# Patient Record
Sex: FEMALE | Race: BLACK OR AFRICAN AMERICAN | NOT HISPANIC OR LATINO | Employment: UNEMPLOYED | RURAL
[De-identification: names, ages, dates, MRNs, and addresses within clinical notes are randomized per-mention and may not be internally consistent; named-entity substitution may affect disease eponyms.]

---

## 2022-08-27 ENCOUNTER — HOSPITAL ENCOUNTER (INPATIENT)
Facility: HOSPITAL | Age: 57
LOS: 2 days | Discharge: HOME | DRG: 177 | End: 2022-08-29
Attending: FAMILY MEDICINE | Admitting: FAMILY MEDICINE
Payer: MEDICAID

## 2022-08-27 DIAGNOSIS — Z79.4 TYPE 2 DIABETES MELLITUS WITHOUT COMPLICATION, WITH LONG-TERM CURRENT USE OF INSULIN: ICD-10-CM

## 2022-08-27 DIAGNOSIS — U07.1 PNEUMONIA DUE TO COVID-19 VIRUS: Primary | ICD-10-CM

## 2022-08-27 DIAGNOSIS — I10 PRIMARY HYPERTENSION: ICD-10-CM

## 2022-08-27 DIAGNOSIS — E11.9 TYPE 2 DIABETES MELLITUS WITHOUT COMPLICATION, WITH LONG-TERM CURRENT USE OF INSULIN: ICD-10-CM

## 2022-08-27 DIAGNOSIS — R07.9 CHEST PAIN: ICD-10-CM

## 2022-08-27 DIAGNOSIS — E11.9 TYPE 2 DIABETES MELLITUS WITHOUT COMPLICATION, WITHOUT LONG-TERM CURRENT USE OF INSULIN: ICD-10-CM

## 2022-08-27 DIAGNOSIS — J12.82 PNEUMONIA DUE TO COVID-19 VIRUS: Primary | ICD-10-CM

## 2022-08-27 DIAGNOSIS — J44.1 COPD EXACERBATION: ICD-10-CM

## 2022-08-27 DIAGNOSIS — Z72.0 TOBACCO USE: ICD-10-CM

## 2022-08-27 DIAGNOSIS — U07.1 COVID: ICD-10-CM

## 2022-08-27 DIAGNOSIS — R09.02 HYPOXIA: ICD-10-CM

## 2022-08-27 LAB
APTT PPP: 26.9 SECONDS (ref 25.2–37.3)
GLUCOSE SERPL-MCNC: 246 MG/DL (ref 70–105)
INR BLD: 1
PROTHROMBIN TIME: 12.8 SECONDS (ref 11.7–14.7)

## 2022-08-27 PROCEDURE — 25000003 PHARM REV CODE 250: Performed by: HOSPITALIST

## 2022-08-27 PROCEDURE — 94761 N-INVAS EAR/PLS OXIMETRY MLT: CPT

## 2022-08-27 PROCEDURE — 99222 PR INITIAL HOSPITAL CARE,LEVL II: ICD-10-PCS | Mod: ,,, | Performed by: HOSPITALIST

## 2022-08-27 PROCEDURE — 99222 1ST HOSP IP/OBS MODERATE 55: CPT | Mod: ,,, | Performed by: HOSPITALIST

## 2022-08-27 PROCEDURE — 63600175 PHARM REV CODE 636 W HCPCS: Performed by: FAMILY MEDICINE

## 2022-08-27 PROCEDURE — 85730 THROMBOPLASTIN TIME PARTIAL: CPT | Performed by: FAMILY MEDICINE

## 2022-08-27 PROCEDURE — 82962 GLUCOSE BLOOD TEST: CPT

## 2022-08-27 PROCEDURE — 25000003 PHARM REV CODE 250: Performed by: FAMILY MEDICINE

## 2022-08-27 PROCEDURE — 36415 COLL VENOUS BLD VENIPUNCTURE: CPT | Performed by: FAMILY MEDICINE

## 2022-08-27 PROCEDURE — 11000001 HC ACUTE MED/SURG PRIVATE ROOM

## 2022-08-27 RX ORDER — GLUCAGON 1 MG
1 KIT INJECTION
Status: DISCONTINUED | OUTPATIENT
Start: 2022-08-27 | End: 2022-08-29 | Stop reason: HOSPADM

## 2022-08-27 RX ORDER — DULOXETIN HYDROCHLORIDE 20 MG/1
20 CAPSULE, DELAYED RELEASE ORAL DAILY
Status: DISCONTINUED | OUTPATIENT
Start: 2022-08-28 | End: 2022-08-29 | Stop reason: HOSPADM

## 2022-08-27 RX ORDER — INSULIN ASPART 100 [IU]/ML
0-5 INJECTION, SOLUTION INTRAVENOUS; SUBCUTANEOUS
Status: DISCONTINUED | OUTPATIENT
Start: 2022-08-27 | End: 2022-08-29 | Stop reason: HOSPADM

## 2022-08-27 RX ORDER — ALBUTEROL SULFATE 90 UG/1
2-4 AEROSOL, METERED RESPIRATORY (INHALATION) 4 TIMES DAILY PRN
COMMUNITY
Start: 2022-08-03

## 2022-08-27 RX ORDER — ENOXAPARIN SODIUM 100 MG/ML
1 INJECTION SUBCUTANEOUS
Status: DISCONTINUED | OUTPATIENT
Start: 2022-08-27 | End: 2022-08-27

## 2022-08-27 RX ORDER — HYDROCODONE BITARTRATE AND ACETAMINOPHEN 7.5; 325 MG/1; MG/1
1 TABLET ORAL EVERY 6 HOURS PRN
Status: DISCONTINUED | OUTPATIENT
Start: 2022-08-27 | End: 2022-08-29 | Stop reason: HOSPADM

## 2022-08-27 RX ORDER — ACETAMINOPHEN 500 MG
1000 TABLET ORAL EVERY 6 HOURS PRN
Status: DISCONTINUED | OUTPATIENT
Start: 2022-08-27 | End: 2022-08-29 | Stop reason: HOSPADM

## 2022-08-27 RX ORDER — SODIUM CHLORIDE 0.9 % (FLUSH) 0.9 %
10 SYRINGE (ML) INJECTION EVERY 12 HOURS PRN
Status: DISCONTINUED | OUTPATIENT
Start: 2022-08-27 | End: 2022-08-29 | Stop reason: HOSPADM

## 2022-08-27 RX ORDER — MUPIROCIN 20 MG/G
OINTMENT TOPICAL 2 TIMES DAILY
Status: DISCONTINUED | OUTPATIENT
Start: 2022-08-27 | End: 2022-08-29 | Stop reason: HOSPADM

## 2022-08-27 RX ORDER — LOSARTAN POTASSIUM 100 MG/1
100 TABLET ORAL DAILY
COMMUNITY

## 2022-08-27 RX ORDER — FLUTICASONE FUROATE, UMECLIDINIUM BROMIDE AND VILANTEROL TRIFENATATE 200; 62.5; 25 UG/1; UG/1; UG/1
1 POWDER RESPIRATORY (INHALATION) DAILY
COMMUNITY

## 2022-08-27 RX ORDER — FUROSEMIDE 40 MG/1
40 TABLET ORAL DAILY
Status: DISCONTINUED | OUTPATIENT
Start: 2022-08-28 | End: 2022-08-29 | Stop reason: HOSPADM

## 2022-08-27 RX ORDER — METFORMIN HYDROCHLORIDE 1000 MG/1
1000 TABLET ORAL 2 TIMES DAILY
COMMUNITY

## 2022-08-27 RX ORDER — DEXAMETHASONE SODIUM PHOSPHATE 4 MG/ML
6 INJECTION, SOLUTION INTRA-ARTICULAR; INTRALESIONAL; INTRAMUSCULAR; INTRAVENOUS; SOFT TISSUE EVERY 24 HOURS
Status: DISCONTINUED | OUTPATIENT
Start: 2022-08-28 | End: 2022-08-29

## 2022-08-27 RX ORDER — GABAPENTIN 300 MG/1
300 CAPSULE ORAL 2 TIMES DAILY
Status: DISCONTINUED | OUTPATIENT
Start: 2022-08-27 | End: 2022-08-29 | Stop reason: HOSPADM

## 2022-08-27 RX ORDER — DULOXETIN HYDROCHLORIDE 20 MG/1
20 CAPSULE, DELAYED RELEASE ORAL DAILY
COMMUNITY

## 2022-08-27 RX ORDER — GABAPENTIN 300 MG/1
300 CAPSULE ORAL 2 TIMES DAILY
COMMUNITY
Start: 2022-07-27

## 2022-08-27 RX ORDER — GUAIFENESIN/DEXTROMETHORPHAN 100-10MG/5
10 SYRUP ORAL EVERY 6 HOURS PRN
Status: DISCONTINUED | OUTPATIENT
Start: 2022-08-27 | End: 2022-08-29 | Stop reason: HOSPADM

## 2022-08-27 RX ORDER — HYDROCODONE BITARTRATE AND ACETAMINOPHEN 7.5; 325 MG/1; MG/1
1 TABLET ORAL EVERY 6 HOURS PRN
COMMUNITY
Start: 2022-07-27

## 2022-08-27 RX ORDER — DAPAGLIFLOZIN 10 MG/1
10 TABLET, FILM COATED ORAL EVERY MORNING
COMMUNITY
Start: 2022-07-20

## 2022-08-27 RX ORDER — ALBUTEROL SULFATE 90 UG/1
2 AEROSOL, METERED RESPIRATORY (INHALATION) EVERY 4 HOURS PRN
Status: DISCONTINUED | OUTPATIENT
Start: 2022-08-27 | End: 2022-08-29 | Stop reason: HOSPADM

## 2022-08-27 RX ORDER — ONDANSETRON 2 MG/ML
8 INJECTION INTRAMUSCULAR; INTRAVENOUS EVERY 6 HOURS PRN
Status: DISCONTINUED | OUTPATIENT
Start: 2022-08-27 | End: 2022-08-29 | Stop reason: HOSPADM

## 2022-08-27 RX ORDER — FUROSEMIDE 40 MG/1
40 TABLET ORAL DAILY
COMMUNITY

## 2022-08-27 RX ORDER — SIMETHICONE 80 MG
1 TABLET,CHEWABLE ORAL 3 TIMES DAILY PRN
Status: DISCONTINUED | OUTPATIENT
Start: 2022-08-27 | End: 2022-08-29 | Stop reason: HOSPADM

## 2022-08-27 RX ORDER — LOSARTAN POTASSIUM 100 MG/1
100 TABLET ORAL DAILY
Status: DISCONTINUED | OUTPATIENT
Start: 2022-08-28 | End: 2022-08-29 | Stop reason: HOSPADM

## 2022-08-27 RX ORDER — BISACODYL 5 MG
10 TABLET, DELAYED RELEASE (ENTERIC COATED) ORAL DAILY PRN
Status: DISCONTINUED | OUTPATIENT
Start: 2022-08-27 | End: 2022-08-29 | Stop reason: HOSPADM

## 2022-08-27 RX ORDER — FAMOTIDINE 20 MG/1
20 TABLET, FILM COATED ORAL 2 TIMES DAILY
Status: DISCONTINUED | OUTPATIENT
Start: 2022-08-27 | End: 2022-08-29 | Stop reason: HOSPADM

## 2022-08-27 RX ORDER — TRAZODONE HYDROCHLORIDE 50 MG/1
50 TABLET ORAL NIGHTLY PRN
Status: DISCONTINUED | OUTPATIENT
Start: 2022-08-27 | End: 2022-08-29 | Stop reason: HOSPADM

## 2022-08-27 RX ORDER — ENOXAPARIN SODIUM 100 MG/ML
1 INJECTION SUBCUTANEOUS
Status: DISCONTINUED | OUTPATIENT
Start: 2022-08-28 | End: 2022-08-29 | Stop reason: HOSPADM

## 2022-08-27 RX ADMIN — INSULIN DETEMIR 15 UNITS: 100 INJECTION, SOLUTION SUBCUTANEOUS at 10:08

## 2022-08-27 RX ADMIN — MUPIROCIN: 20 OINTMENT TOPICAL at 08:08

## 2022-08-27 RX ADMIN — FAMOTIDINE 20 MG: 20 TABLET, FILM COATED ORAL at 08:08

## 2022-08-27 RX ADMIN — REMDESIVIR 200 MG: 100 INJECTION, POWDER, LYOPHILIZED, FOR SOLUTION INTRAVENOUS at 10:08

## 2022-08-27 RX ADMIN — GABAPENTIN 300 MG: 300 CAPSULE ORAL at 10:08

## 2022-08-28 PROBLEM — R09.02 HYPOXIA: Status: ACTIVE | Noted: 2022-08-28

## 2022-08-28 PROBLEM — J12.82 PNEUMONIA DUE TO COVID-19 VIRUS: Status: ACTIVE | Noted: 2022-08-27

## 2022-08-28 LAB
ALBUMIN SERPL BCP-MCNC: 3.1 G/DL (ref 3.5–5)
ALBUMIN/GLOB SERPL: 1 {RATIO}
ALP SERPL-CCNC: 70 U/L (ref 46–118)
ALT SERPL W P-5'-P-CCNC: 29 U/L (ref 13–56)
ANION GAP SERPL CALCULATED.3IONS-SCNC: 13 MMOL/L (ref 7–16)
AST SERPL W P-5'-P-CCNC: 13 U/L (ref 15–37)
ATYPICAL LYMPHOCYTES: NORMAL
BASOPHILS # BLD AUTO: 0.07 K/UL (ref 0–0.2)
BASOPHILS NFR BLD AUTO: 0.5 % (ref 0–1)
BILIRUB SERPL-MCNC: 0.2 MG/DL (ref ?–1.2)
BUN SERPL-MCNC: 23 MG/DL (ref 7–18)
BUN/CREAT SERPL: 37 (ref 6–20)
CALCIUM SERPL-MCNC: 8.7 MG/DL (ref 8.5–10.1)
CHLORIDE SERPL-SCNC: 102 MMOL/L (ref 98–107)
CO2 SERPL-SCNC: 26 MMOL/L (ref 21–32)
CREAT SERPL-MCNC: 0.62 MG/DL (ref 0.55–1.02)
CRP SERPL-MCNC: 0.31 MG/DL (ref 0–0.8)
D DIMER PPP FEU-MCNC: 0.29 ΜG/ML (ref 0–0.47)
DIFFERENTIAL METHOD BLD: ABNORMAL
EGFR (NO RACE VARIABLE) (RUSH/TITUS): 105 ML/MIN/1.73M²
EOSINOPHIL # BLD AUTO: 0.03 K/UL (ref 0–0.5)
EOSINOPHIL NFR BLD AUTO: 0.2 % (ref 1–4)
ERYTHROCYTE [DISTWIDTH] IN BLOOD BY AUTOMATED COUNT: 15.5 % (ref 11.5–14.5)
FERRITIN SERPL-MCNC: 219 NG/ML (ref 8–252)
GLOBULIN SER-MCNC: 3 G/DL (ref 2–4)
GLUCOSE SERPL-MCNC: 221 MG/DL (ref 70–105)
GLUCOSE SERPL-MCNC: 237 MG/DL (ref 74–106)
GLUCOSE SERPL-MCNC: 262 MG/DL (ref 70–105)
GLUCOSE SERPL-MCNC: 279 MG/DL (ref 70–105)
GLUCOSE SERPL-MCNC: 306 MG/DL (ref 70–105)
HCT VFR BLD AUTO: 45.4 % (ref 38–47)
HGB BLD-MCNC: 15.3 G/DL (ref 12–16)
IMM GRANULOCYTES # BLD AUTO: 0.45 K/UL (ref 0–0.04)
IMM GRANULOCYTES NFR BLD: 3 % (ref 0–0.4)
LYMPHOCYTES # BLD AUTO: 5.52 K/UL (ref 1–4.8)
LYMPHOCYTES NFR BLD AUTO: 36.4 % (ref 27–41)
LYMPHOCYTES NFR BLD MANUAL: 39 % (ref 27–41)
MAGNESIUM SERPL-MCNC: 2.4 MG/DL (ref 1.7–2.3)
MCH RBC QN AUTO: 28.1 PG (ref 27–31)
MCHC RBC AUTO-ENTMCNC: 33.7 G/DL (ref 32–36)
MCV RBC AUTO: 83.5 FL (ref 80–96)
MONOCYTES # BLD AUTO: 0.9 K/UL (ref 0–0.8)
MONOCYTES NFR BLD AUTO: 5.9 % (ref 2–6)
MONOCYTES NFR BLD MANUAL: 6 % (ref 2–6)
MPC BLD CALC-MCNC: 10.5 FL (ref 9.4–12.4)
NEUTROPHILS # BLD AUTO: 8.19 K/UL (ref 1.8–7.7)
NEUTROPHILS NFR BLD AUTO: 54 % (ref 53–65)
NEUTS SEG NFR BLD MANUAL: 55 % (ref 50–62)
NRBC # BLD AUTO: 0 X10E3/UL
NRBC, AUTO (.00): 0 %
PLATELET # BLD AUTO: 199 K/UL (ref 150–400)
PLATELET MORPHOLOGY: NORMAL
POTASSIUM SERPL-SCNC: 3.7 MMOL/L (ref 3.5–5.1)
PROT SERPL-MCNC: 6.1 G/DL (ref 6.4–8.2)
RBC # BLD AUTO: 5.44 M/UL (ref 4.2–5.4)
RBC MORPH BLD: NORMAL
SODIUM SERPL-SCNC: 137 MMOL/L (ref 136–145)
TSH SERPL DL<=0.005 MIU/L-ACNC: 1.19 UIU/ML (ref 0.36–3.74)
WBC # BLD AUTO: 15.16 K/UL (ref 4.5–11)

## 2022-08-28 PROCEDURE — 85025 COMPLETE CBC W/AUTO DIFF WBC: CPT | Performed by: HOSPITALIST

## 2022-08-28 PROCEDURE — 36415 COLL VENOUS BLD VENIPUNCTURE: CPT | Performed by: HOSPITALIST

## 2022-08-28 PROCEDURE — 99232 SBSQ HOSP IP/OBS MODERATE 35: CPT | Mod: GC,,, | Performed by: FAMILY MEDICINE

## 2022-08-28 PROCEDURE — 80053 COMPREHEN METABOLIC PANEL: CPT | Performed by: HOSPITALIST

## 2022-08-28 PROCEDURE — 25000003 PHARM REV CODE 250: Performed by: FAMILY MEDICINE

## 2022-08-28 PROCEDURE — 82962 GLUCOSE BLOOD TEST: CPT

## 2022-08-28 PROCEDURE — 63600175 PHARM REV CODE 636 W HCPCS

## 2022-08-28 PROCEDURE — 25000242 PHARM REV CODE 250 ALT 637 W/ HCPCS: Performed by: FAMILY MEDICINE

## 2022-08-28 PROCEDURE — 63600175 PHARM REV CODE 636 W HCPCS: Performed by: HOSPITALIST

## 2022-08-28 PROCEDURE — 94640 AIRWAY INHALATION TREATMENT: CPT

## 2022-08-28 PROCEDURE — 84443 ASSAY THYROID STIM HORMONE: CPT | Performed by: HOSPITALIST

## 2022-08-28 PROCEDURE — 11000001 HC ACUTE MED/SURG PRIVATE ROOM

## 2022-08-28 PROCEDURE — 85378 FIBRIN DEGRADE SEMIQUANT: CPT | Performed by: HOSPITALIST

## 2022-08-28 PROCEDURE — 63600175 PHARM REV CODE 636 W HCPCS: Performed by: FAMILY MEDICINE

## 2022-08-28 PROCEDURE — 94761 N-INVAS EAR/PLS OXIMETRY MLT: CPT

## 2022-08-28 PROCEDURE — 99232 PR SUBSEQUENT HOSPITAL CARE,LEVL II: ICD-10-PCS | Mod: GC,,, | Performed by: FAMILY MEDICINE

## 2022-08-28 PROCEDURE — 82728 ASSAY OF FERRITIN: CPT | Performed by: HOSPITALIST

## 2022-08-28 PROCEDURE — 86140 C-REACTIVE PROTEIN: CPT | Performed by: HOSPITALIST

## 2022-08-28 PROCEDURE — 83735 ASSAY OF MAGNESIUM: CPT | Performed by: HOSPITALIST

## 2022-08-28 PROCEDURE — 25000003 PHARM REV CODE 250: Performed by: HOSPITALIST

## 2022-08-28 RX ORDER — CYCLOBENZAPRINE HCL 5 MG
5 TABLET ORAL 3 TIMES DAILY PRN
Status: DISCONTINUED | OUTPATIENT
Start: 2022-08-28 | End: 2022-08-29 | Stop reason: HOSPADM

## 2022-08-28 RX ADMIN — DEXAMETHASONE SODIUM PHOSPHATE 6 MG: 4 INJECTION, SOLUTION INTRAMUSCULAR; INTRAVENOUS at 09:08

## 2022-08-28 RX ADMIN — INSULIN ASPART 3 UNITS: 100 INJECTION, SOLUTION INTRAVENOUS; SUBCUTANEOUS at 05:08

## 2022-08-28 RX ADMIN — ENOXAPARIN SODIUM 100 MG: 100 INJECTION SUBCUTANEOUS at 11:08

## 2022-08-28 RX ADMIN — FAMOTIDINE 20 MG: 20 TABLET, FILM COATED ORAL at 09:08

## 2022-08-28 RX ADMIN — BISACODYL 10 MG: 5 TABLET, COATED ORAL at 11:08

## 2022-08-28 RX ADMIN — REMDESIVIR 100 MG: 100 INJECTION, POWDER, LYOPHILIZED, FOR SOLUTION INTRAVENOUS at 10:08

## 2022-08-28 RX ADMIN — HYDROCODONE BITARTRATE AND ACETAMINOPHEN 1 TABLET: 7.5; 325 TABLET ORAL at 06:08

## 2022-08-28 RX ADMIN — INSULIN DETEMIR 18 UNITS: 100 INJECTION, SOLUTION SUBCUTANEOUS at 09:08

## 2022-08-28 RX ADMIN — GABAPENTIN 300 MG: 300 CAPSULE ORAL at 09:08

## 2022-08-28 RX ADMIN — INSULIN ASPART 3 UNITS: 100 INJECTION, SOLUTION INTRAVENOUS; SUBCUTANEOUS at 11:08

## 2022-08-28 RX ADMIN — MUPIROCIN: 20 OINTMENT TOPICAL at 09:08

## 2022-08-28 RX ADMIN — ONDANSETRON 8 MG: 2 INJECTION INTRAMUSCULAR; INTRAVENOUS at 10:08

## 2022-08-28 RX ADMIN — FUROSEMIDE 40 MG: 40 TABLET ORAL at 09:08

## 2022-08-28 RX ADMIN — LOSARTAN POTASSIUM 100 MG: 100 TABLET, FILM COATED ORAL at 09:08

## 2022-08-28 RX ADMIN — ONDANSETRON 8 MG: 2 INJECTION INTRAMUSCULAR; INTRAVENOUS at 09:08

## 2022-08-28 RX ADMIN — HYDROCODONE BITARTRATE AND ACETAMINOPHEN 1 TABLET: 7.5; 325 TABLET ORAL at 11:08

## 2022-08-28 RX ADMIN — TIOTROPIUM BROMIDE INHALATION SPRAY 2 PUFF: 3.12 SPRAY, METERED RESPIRATORY (INHALATION) at 08:08

## 2022-08-28 RX ADMIN — ENOXAPARIN SODIUM 100 MG: 100 INJECTION SUBCUTANEOUS at 12:08

## 2022-08-28 RX ADMIN — INSULIN ASPART 1 UNITS: 100 INJECTION, SOLUTION INTRAVENOUS; SUBCUTANEOUS at 09:08

## 2022-08-28 RX ADMIN — DULOXETINE 20 MG: 20 CAPSULE, DELAYED RELEASE ORAL at 09:08

## 2022-08-28 RX ADMIN — INSULIN ASPART 4 UNITS: 100 INJECTION, SOLUTION INTRAVENOUS; SUBCUTANEOUS at 05:08

## 2022-08-28 NOTE — PROGRESS NOTES
[facility] - [address] [place] [address] Medicine  Progress Note    Patient Name: Seamus Urbina  MRN: [id number]  Patient Class: IP- Inpatient   Admission Date: 8/27/2022  Length of Stay: 1 days  Attending Physician: Zulema Florence DO  Primary Care Provider: Halley Harden MD        Subjective:     Principal Problem:Pneumonia due to COVID-19 virus        HPI:  56yo F who was transferred to [facility] from [facility] with worsening dyspnea and COVID-19.     Patient presented to OSH ED on 8/23 after being ill at home for several days prior. She was diagnosed with COVID and given steroids and discharged home. Returned next day with worsening dyspnea, coughing, and wheezing as well as hyperglycemia secondary to steroid use. She has a history of DM2, HTN, and COPD with tobacco use but does not require home oxygen. Patient was admitted to OSH for further treatment including dexamethasone, IV antibiotics, and supplemental oxygen. On day of transfer she was more dyspneic though still with appropriate sat on 2L NC. Given her comorbidities and risk of decompensation she was transferred to [facility] for further care.     On my exam she is breathing comfortably at rest on 2L NC but does have frequent coughing and expiratory wheezing with deep inspiration. Reports chest tightness but denies chest pain. Also with nausea and constipation with only one small BM over last several days. On review of OSH records, CMP unremarkable with exception of . Leukocytosis to 15.6. A1c elevated at 9.8 and BNP of 316. CRP elevated at 20. CXR without focal infiltrate but with increased interstitial markings per report.       Overview/Hospital Course:  No notes on file    Interval History: Pt is awake, resting in bed. No overnight events. Pt currently complaining of muscle spasm on right side of neck and shoulder, will give flexeril 5mg. Pt also complaining of constipation, last bm 3 days ago, will give  dulcolax prn.    Review of Systems   Constitutional:  Positive for fatigue. Negative for chills and fever.   HENT:  Positive for congestion and rhinorrhea. Negative for sore throat and trouble swallowing.    Eyes:  Negative for photophobia and visual disturbance.   Respiratory:  Positive for cough, chest tightness, shortness of breath and wheezing.    Cardiovascular:  Negative for chest pain, palpitations and leg swelling.   Gastrointestinal:  Positive for constipation and nausea. Negative for abdominal pain, diarrhea and vomiting.   Genitourinary:  Negative for dysuria, hematuria and urgency.   Musculoskeletal:  Positive for arthralgias and back pain. Negative for joint swelling and myalgias.   Skin:  Negative for rash and wound.   Neurological:  Positive for headaches. Negative for dizziness, syncope, weakness and light-headedness.   Psychiatric/Behavioral:  Negative for confusion, decreased concentration, dysphoric mood and sleep disturbance. The patient is not nervous/anxious.    Objective:     Vital Signs (Most Recent):  Temp: 96.4 °F (35.8 °C) (08/28/22 0712)  Pulse: 66 (08/28/22 0812)  Resp: 20 (08/28/22 0812)  BP: 125/87 (08/28/22 0712)  SpO2: 96 % (08/28/22 0712) Vital Signs (24h Range):  Temp:  [96.4 °F (35.8 °C)-98.8 °F (37.1 °C)] 96.4 °F (35.8 °C)  Pulse:  [63-82] 66  Resp:  [16-20] 20  SpO2:  [95 %-100 %] 96 %  BP: ()/(55-89) 125/87     Weight: 105.7 kg (233 lb)  Body mass index is 47.06 kg/m².  No intake or output data in the 24 hours ending 08/28/22 1132   Physical Exam  Vitals reviewed.   Constitutional:       General: She is not in acute distress.     Appearance: Normal appearance.   HENT:      Head: Normocephalic and atraumatic.      Mouth/Throat:      Mouth: Mucous membranes are moist.      Pharynx: Posterior oropharyngeal erythema present. No oropharyngeal exudate.   Eyes:      General: No scleral icterus.     Extraocular Movements: Extraocular movements intact.      Pupils: Pupils are  equal, round, and reactive to light.   Cardiovascular:      Rate and Rhythm: Normal rate and regular rhythm.      Heart sounds: Normal heart sounds.   Pulmonary:      Effort: Pulmonary effort is normal. No respiratory distress.      Breath sounds: Wheezing present. No rhonchi or rales.   Abdominal:      General: Bowel sounds are normal. There is no distension.      Palpations: Abdomen is soft.      Tenderness: There is no abdominal tenderness.   Musculoskeletal:      Cervical back: Normal range of motion and neck supple.      Right lower leg: No edema.      Left lower leg: No edema.   Skin:     General: Skin is warm and dry.      Findings: No rash.   Neurological:      General: No focal deficit present.      Mental Status: She is alert and oriented to person, place, and time.   Psychiatric:         Mood and Affect: Mood normal.         Behavior: Behavior normal.       Significant Labs: All pertinent labs within the past 24 hours have been reviewed.  Recent Lab Results  (Last 5 results in the past 24 hours)        08/28/22  1009   08/28/22  0505   08/28/22  0334   08/27/22  2229   08/27/22  2218        Albumin/Globulin Ratio     1.0           Albumin     3.1           Alkaline Phosphatase     70           ALT     29           Anion Gap     13           aPTT       26.9         AST     13           Atypical Lymphocytes     Few           Baso #     0.07           Basophil %     0.5           BILIRUBIN TOTAL     0.2           BUN     23           BUN/CREAT RATIO     37           Calcium     8.7           Chloride     102           CO2     26           Creatinine     0.62           CRP     0.31           D-Dimer     0.29           Differential Type     Scan Smear           eGFR     105           Eos #     0.03           Eosinophil %     0.2           Ferritin     219           Globulin, Total     3.0           Glucose     237           Hematocrit     45.4           Hemoglobin     15.3           Immature Grans (Abs)      0.45           Immature Granulocytes     3.0           INR       1.00         Lymph #     5.52           Lymph %     36.4                39           Magnesium     2.4           MCH     28.1           MCHC     33.7           MCV     83.5           Mono #     0.90           Mono %     5.9                6           MPV     10.5           Neutrophils, Abs     8.19           Neutrophils Relative     54.0           nRBC     0.0           NUCLEATED RBC ABSOLUTE     0.00           PLATELET MORPHOLOGY     Normal           Platelets     199           POC Glucose 279   221       246       Potassium     3.7           PROTEIN TOTAL     6.1           Protime       12.8         RBC     5.44           RBC Morphology     Normal           RDW     15.5           Segmented Neutrophils, Man %     55           Sodium     137           TSH     1.190           WBC     15.16                                  Significant Imaging: I have reviewed all pertinent imaging results/findings within the past 24 hours.      Assessment/Plan:      * Pneumonia due to COVID-19 virus  Patient diagnosed 8/23. Currently requiring 2L supplemental oxygen. She was treated at OSH with dexamethasone and IV antibiotics including azithromycin and rocephin. Given O2 requirement and comorbidities including HTN, DM2, and obesity patient is at risk of worsening disease and complications; will initiate remdesivir.     - Repeat CXR shows no acute cardiopulmonary processes  - Continue Remdesivir   - Dexamethasone 6mg q24h   - Will initiate therapeutic dose anticoagulation with lovenox 1mg/kg  - Famotidine 20mg BID     COPD (chronic obstructive pulmonary disease)  No signs of acute exacerbation at this time  - Scheduled and PRN inhalers    Hypoxia  - Patient with new O2 requirement in the setting of COVID pneumonia   - Currently maintaining appropriate sat on 2L via NC, will wean as tolerated     Tobacco use  Patient with significant smoking history but has not used  tobacco since symptom onset. Refuses nicotine patch at this time.     Hypertension  - Controlled on current home meds, will continue    Diabetes mellitus  - On oral medications for chronic management of DM2, holding while inpatient   - A1c on admission 9.8  - Will start levemir 15u qhs with SSI, increase basal insulin as needed as patient is receiving steroids       VTE Risk Mitigation (From admission, onward)         Ordered     enoxaparin injection 100 mg  Every 12 hours (non-standard times)         08/27/22 2306     IP VTE HIGH RISK PATIENT  Once         08/27/22 2134                Discharge Planning   QUIANA:      Code Status: Full Code   Is the patient medically ready for discharge?:     Reason for patient still in hospital (select all that apply): Treatment                     Sudhakar Rothman MD  Department of Hospital Medicine   [facility] - [facility] [address] [facility] Telemetry

## 2022-08-28 NOTE — ASSESSMENT & PLAN NOTE
- Patient with new O2 requirement in the setting of COVID pneumonia   - Currently maintaining appropriate sat on 2L via NC, will wean as tolerated

## 2022-08-28 NOTE — ASSESSMENT & PLAN NOTE
Patient with significant smoking history but has not used tobacco since symptom onset. Refuses nicotine patch at this time.

## 2022-08-28 NOTE — SUBJECTIVE & OBJECTIVE
Past Medical History:   Diagnosis Date    Cervical radiculopathy     COPD (chronic obstructive pulmonary disease)     Diabetes mellitus     Hypertension        Past Surgical History:   Procedure Laterality Date    APPENDECTOMY         Review of patient's allergies indicates:   Allergen Reactions    Glipizide Nausea Only       No current facility-administered medications on file prior to encounter.     Current Outpatient Medications on File Prior to Encounter   Medication Sig    albuterol (PROVENTIL/VENTOLIN HFA) 90 mcg/actuation inhaler Inhale 2-4 puffs into the lungs 4 (four) times daily as needed.    dulaglutide (TRULICITY) 1.5 mg/0.5 mL pen injector Inject 1.5 mg into the skin once a week.    DULoxetine (CYMBALTA) 20 MG capsule Take 20 mg by mouth once daily.    FARXIGA 10 mg tablet Take 10 mg by mouth every morning.    fluticasone-umeclidin-vilanter (TRELEGY ELLIPTA) 200-62.5-25 mcg inhaler Inhale 1 puff into the lungs once daily.    furosemide (LASIX) 40 MG tablet Take 40 mg by mouth once daily.    gabapentin (NEURONTIN) 300 MG capsule Take 300 mg by mouth 2 (two) times daily.    HYDROcodone-acetaminophen (NORCO) 7.5-325 mg per tablet Take 1 tablet by mouth every 6 (six) hours as needed.    losartan (COZAAR) 100 MG tablet Take 100 mg by mouth once daily at 6am.    metFORMIN (GLUCOPHAGE) 1000 MG tablet Take 1,000 mg by mouth 2 (two) times a day.    SITagliptin (JANUVIA) 100 MG Tab Take 100 mg by mouth once daily at 6am.     Family History       Family history is unknown by patient.          Tobacco Use    Smoking status: Former     Packs/day: 0.50     Types: Cigarettes     Quit date: 2022     Years since quittin.0    Smokeless tobacco: Never   Substance and Sexual Activity    Alcohol use: Yes     Alcohol/week: 1.0 - 2.0 standard drink     Types: 1 - 2 Cans of beer per week    Drug use: Never    Sexual activity: Not on file     Review of Systems   Constitutional:  Positive for fatigue. Negative for  chills and fever.   HENT:  Positive for congestion and rhinorrhea. Negative for sore throat and trouble swallowing.    Eyes:  Negative for photophobia and visual disturbance.   Respiratory:  Positive for cough, chest tightness, shortness of breath and wheezing.    Cardiovascular:  Negative for chest pain, palpitations and leg swelling.   Gastrointestinal:  Positive for constipation and nausea. Negative for abdominal pain, diarrhea and vomiting.   Genitourinary:  Negative for dysuria, hematuria and urgency.   Musculoskeletal:  Positive for arthralgias and back pain. Negative for joint swelling and myalgias.   Skin:  Negative for rash and wound.   Neurological:  Positive for headaches. Negative for dizziness, syncope, weakness and light-headedness.   Psychiatric/Behavioral:  Negative for confusion, decreased concentration, dysphoric mood and sleep disturbance. The patient is not nervous/anxious.    Objective:     Vital Signs (Most Recent):  Temp: 98.8 °F (37.1 °C) (08/27/22 1913)  Pulse: 76 (08/27/22 1913)  Resp: 18 (08/27/22 1913)  BP: 119/68 (08/27/22 1913)  SpO2: 97 % (08/27/22 1913) Vital Signs (24h Range):  Temp:  [98.2 °F (36.8 °C)-98.8 °F (37.1 °C)] 98.8 °F (37.1 °C)  Pulse:  [63-82] 76  Resp:  [18-20] 18  SpO2:  [95 %-98 %] 97 %  BP: ()/(55-89) 119/68     Weight: 95.8 kg (211 lb 3.2 oz)  Body mass index is 42.66 kg/m².    Physical Exam  Vitals reviewed.   Constitutional:       General: She is not in acute distress.     Appearance: Normal appearance.   HENT:      Head: Normocephalic and atraumatic.      Mouth/Throat:      Mouth: Mucous membranes are moist.      Pharynx: Posterior oropharyngeal erythema present. No oropharyngeal exudate.   Eyes:      General: No scleral icterus.     Extraocular Movements: Extraocular movements intact.      Pupils: Pupils are equal, round, and reactive to light.   Cardiovascular:      Rate and Rhythm: Normal rate and regular rhythm.      Heart sounds: Normal heart sounds.    Pulmonary:      Effort: Pulmonary effort is normal. No respiratory distress.      Breath sounds: Wheezing present. No rhonchi or rales.   Abdominal:      General: Bowel sounds are normal. There is no distension.      Palpations: Abdomen is soft.      Tenderness: There is no abdominal tenderness.   Musculoskeletal:      Cervical back: Normal range of motion and neck supple.      Right lower leg: No edema.      Left lower leg: No edema.   Skin:     General: Skin is warm and dry.      Findings: No rash.   Neurological:      General: No focal deficit present.      Mental Status: She is alert and oriented to person, place, and time.   Psychiatric:         Mood and Affect: Mood normal.         Behavior: Behavior normal.         CRANIAL NERVES     CN III, IV, VI   Pupils are equal, round, and reactive to light.     Significant Labs: All pertinent labs within the past 24 hours have been reviewed.    Significant Imaging: I have reviewed all pertinent imaging results/findings within the past 24 hours.

## 2022-08-28 NOTE — ASSESSMENT & PLAN NOTE
- On oral medications for chronic management of DM2, holding while inpatient   - A1c on admission 9.8  - Will start levemir 15u qhs with SSI, increase basal insulin as needed as patient is receiving steroids

## 2022-08-28 NOTE — SUBJECTIVE & OBJECTIVE
Interval History: Pt is awake, resting in bed. No overnight events. Pt currently complaining of muscle spasm on right side of neck and shoulder, will give flexeril 5mg. Pt also complaining of constipation, last bm 3 days ago, will give dulcolax prn.    Review of Systems   Constitutional:  Positive for fatigue. Negative for chills and fever.   HENT:  Positive for congestion and rhinorrhea. Negative for sore throat and trouble swallowing.    Eyes:  Negative for photophobia and visual disturbance.   Respiratory:  Positive for cough, chest tightness, shortness of breath and wheezing.    Cardiovascular:  Negative for chest pain, palpitations and leg swelling.   Gastrointestinal:  Positive for constipation and nausea. Negative for abdominal pain, diarrhea and vomiting.   Genitourinary:  Negative for dysuria, hematuria and urgency.   Musculoskeletal:  Positive for arthralgias and back pain. Negative for joint swelling and myalgias.   Skin:  Negative for rash and wound.   Neurological:  Positive for headaches. Negative for dizziness, syncope, weakness and light-headedness.   Psychiatric/Behavioral:  Negative for confusion, decreased concentration, dysphoric mood and sleep disturbance. The patient is not nervous/anxious.    Objective:     Vital Signs (Most Recent):  Temp: 96.4 °F (35.8 °C) (08/28/22 0712)  Pulse: 66 (08/28/22 0812)  Resp: 20 (08/28/22 0812)  BP: 125/87 (08/28/22 0712)  SpO2: 96 % (08/28/22 0712) Vital Signs (24h Range):  Temp:  [96.4 °F (35.8 °C)-98.8 °F (37.1 °C)] 96.4 °F (35.8 °C)  Pulse:  [63-82] 66  Resp:  [16-20] 20  SpO2:  [95 %-100 %] 96 %  BP: ()/(55-89) 125/87     Weight: 105.7 kg (233 lb)  Body mass index is 47.06 kg/m².  No intake or output data in the 24 hours ending 08/28/22 1132   Physical Exam  Vitals reviewed.   Constitutional:       General: She is not in acute distress.     Appearance: Normal appearance.   HENT:      Head: Normocephalic and atraumatic.      Mouth/Throat:      Mouth:  Mucous membranes are moist.      Pharynx: Posterior oropharyngeal erythema present. No oropharyngeal exudate.   Eyes:      General: No scleral icterus.     Extraocular Movements: Extraocular movements intact.      Pupils: Pupils are equal, round, and reactive to light.   Cardiovascular:      Rate and Rhythm: Normal rate and regular rhythm.      Heart sounds: Normal heart sounds.   Pulmonary:      Effort: Pulmonary effort is normal. No respiratory distress.      Breath sounds: Wheezing present. No rhonchi or rales.   Abdominal:      General: Bowel sounds are normal. There is no distension.      Palpations: Abdomen is soft.      Tenderness: There is no abdominal tenderness.   Musculoskeletal:      Cervical back: Normal range of motion and neck supple.      Right lower leg: No edema.      Left lower leg: No edema.   Skin:     General: Skin is warm and dry.      Findings: No rash.   Neurological:      General: No focal deficit present.      Mental Status: She is alert and oriented to person, place, and time.   Psychiatric:         Mood and Affect: Mood normal.         Behavior: Behavior normal.       Significant Labs: All pertinent labs within the past 24 hours have been reviewed.  Recent Lab Results  (Last 5 results in the past 24 hours)        08/28/22  1009   08/28/22  0505   08/28/22  0334   08/27/22  2229   08/27/22  2218        Albumin/Globulin Ratio     1.0           Albumin     3.1           Alkaline Phosphatase     70           ALT     29           Anion Gap     13           aPTT       26.9         AST     13           Atypical Lymphocytes     Few           Baso #     0.07           Basophil %     0.5           BILIRUBIN TOTAL     0.2           BUN     23           BUN/CREAT RATIO     37           Calcium     8.7           Chloride     102           CO2     26           Creatinine     0.62           CRP     0.31           D-Dimer     0.29           Differential Type     Scan Smear           eGFR     105            Eos #     0.03           Eosinophil %     0.2           Ferritin     219           Globulin, Total     3.0           Glucose     237           Hematocrit     45.4           Hemoglobin     15.3           Immature Grans (Abs)     0.45           Immature Granulocytes     3.0           INR       1.00         Lymph #     5.52           Lymph %     36.4                39           Magnesium     2.4           MCH     28.1           MCHC     33.7           MCV     83.5           Mono #     0.90           Mono %     5.9                6           MPV     10.5           Neutrophils, Abs     8.19           Neutrophils Relative     54.0           nRBC     0.0           NUCLEATED RBC ABSOLUTE     0.00           PLATELET MORPHOLOGY     Normal           Platelets     199           POC Glucose 279   221       246       Potassium     3.7           PROTEIN TOTAL     6.1           Protime       12.8         RBC     5.44           RBC Morphology     Normal           RDW     15.5           Segmented Neutrophils, Man %     55           Sodium     137           TSH     1.190           WBC     15.16                                  Significant Imaging: I have reviewed all pertinent imaging results/findings within the past 24 hours.

## 2022-08-28 NOTE — ASSESSMENT & PLAN NOTE
Patient diagnosed 8/23. Currently requiring 2L supplemental oxygen. She was treated at OSH with dexamethasone and IV antibiotics including azithromycin and rocephin. Given O2 requirement and comorbidities including HTN, DM2, and obesity patient is at risk of worsening disease and complications; will initiate remdesivir.     - Repeat CXR given worsened respiratory status and last was 8/24 prior to admission to OSH   - Will start remdesivir as above  - Dexamethasone 6mg q24h   - Will initiate therapeutic dose anticoagulation with lovenox 1mg/kg  - Famotidine 20mg BID

## 2022-08-28 NOTE — HPI
56yo F who was transferred to [facility] from [facility] with worsening dyspnea and COVID-19.     Patient presented to OSH ED on 8/23 after being ill at home for several days prior. She was diagnosed with COVID and given steroids and discharged home. Returned next day with worsening dyspnea, coughing, and wheezing as well as hyperglycemia secondary to steroid use. She has a history of DM2, HTN, and COPD with tobacco use but does not require home oxygen. Patient was admitted to OSH for further treatment including dexamethasone, IV antibiotics, and supplemental oxygen. On day of transfer she was more dyspneic though still with appropriate sat on 2L NC. Given her comorbidities and risk of decompensation she was transferred to [facility] for further care.     On my exam she is breathing comfortably at rest on 2L NC but does have frequent coughing and expiratory wheezing with deep inspiration. Reports chest tightness but denies chest pain. Also with nausea and constipation with only one small BM over last several days. On review of OSH records, CMP unremarkable with exception of . Leukocytosis to 15.6. A1c elevated at 9.8 and BNP of 316. CRP elevated at 20. CXR without focal infiltrate but with increased interstitial markings per report.

## 2022-08-28 NOTE — H&P
[facility] - [address] [place] [address] Medicine  History & Physical    Patient Name: Seamus Urbina  MRN: [id number]  Patient Class: IP- Inpatient  Admission Date: 8/27/2022  Attending Physician: Zulema Florence DO   Primary Care Provider: Primary Doctor No         Patient information was obtained from patient, past medical records and ER records.     Subjective:     Principal Problem:COVID-19    Chief Complaint:   Chief Complaint   Patient presents with    Shortness of Breath        HPI: 58yo F who was transferred to [facility] from [facility] with worsening dyspnea and COVID-19.     Patient presented to OSH ED on 8/23 after being ill at home for several days prior. She was diagnosed with COVID and given steroids and discharged home. Returned next day with worsening dyspnea, coughing, and wheezing as well as hyperglycemia secondary to steroid use. She has a history of DM2, HTN, and COPD with tobacco use but does not require home oxygen. Patient was admitted to OSH for further treatment including dexamethasone, IV antibiotics, and supplemental oxygen. On day of transfer she was more dyspneic though still with appropriate sat on 2L NC. Given her comorbidities and risk of decompensation she was transferred to [facility] for further care.     On my exam she is breathing comfortably at rest on 2L NC but does have frequent coughing and expiratory wheezing with deep inspiration. Reports chest tightness but denies chest pain. Also with nausea and constipation with only one small BM over last several days. On review of OSH records, CMP unremarkable with exception of . Leukocytosis to 15.6. A1c elevated at 9.8 and BNP of 316. CRP elevated at 20. CXR without focal infiltrate but with increased interstitial markings per report.       Past Medical History:   Diagnosis Date    Cervical radiculopathy     COPD (chronic obstructive pulmonary disease)     Diabetes mellitus     Hypertension         Past Surgical History:   Procedure Laterality Date    APPENDECTOMY         Review of patient's allergies indicates:   Allergen Reactions    Glipizide Nausea Only       No current facility-administered medications on file prior to encounter.     Current Outpatient Medications on File Prior to Encounter   Medication Sig    albuterol (PROVENTIL/VENTOLIN HFA) 90 mcg/actuation inhaler Inhale 2-4 puffs into the lungs 4 (four) times daily as needed.    dulaglutide (TRULICITY) 1.5 mg/0.5 mL pen injector Inject 1.5 mg into the skin once a week.    DULoxetine (CYMBALTA) 20 MG capsule Take 20 mg by mouth once daily.    FARXIGA 10 mg tablet Take 10 mg by mouth every morning.    fluticasone-umeclidin-vilanter (TRELEGY ELLIPTA) 200-62.5-25 mcg inhaler Inhale 1 puff into the lungs once daily.    furosemide (LASIX) 40 MG tablet Take 40 mg by mouth once daily.    gabapentin (NEURONTIN) 300 MG capsule Take 300 mg by mouth 2 (two) times daily.    HYDROcodone-acetaminophen (NORCO) 7.5-325 mg per tablet Take 1 tablet by mouth every 6 (six) hours as needed.    losartan (COZAAR) 100 MG tablet Take 100 mg by mouth once daily at 6am.    metFORMIN (GLUCOPHAGE) 1000 MG tablet Take 1,000 mg by mouth 2 (two) times a day.    SITagliptin (JANUVIA) 100 MG Tab Take 100 mg by mouth once daily at 6am.     Family History       Family history is unknown by patient.          Tobacco Use    Smoking status: Former     Packs/day: 0.50     Types: Cigarettes     Quit date: 2022     Years since quittin.0    Smokeless tobacco: Never   Substance and Sexual Activity    Alcohol use: Yes     Alcohol/week: 1.0 - 2.0 standard drink     Types: 1 - 2 Cans of beer per week    Drug use: Never    Sexual activity: Not on file     Review of Systems   Constitutional:  Positive for fatigue. Negative for chills and fever.   HENT:  Positive for congestion and rhinorrhea. Negative for sore throat and trouble swallowing.    Eyes:  Negative for photophobia and  visual disturbance.   Respiratory:  Positive for cough, chest tightness, shortness of breath and wheezing.    Cardiovascular:  Negative for chest pain, palpitations and leg swelling.   Gastrointestinal:  Positive for constipation and nausea. Negative for abdominal pain, diarrhea and vomiting.   Genitourinary:  Negative for dysuria, hematuria and urgency.   Musculoskeletal:  Positive for arthralgias and back pain. Negative for joint swelling and myalgias.   Skin:  Negative for rash and wound.   Neurological:  Positive for headaches. Negative for dizziness, syncope, weakness and light-headedness.   Psychiatric/Behavioral:  Negative for confusion, decreased concentration, dysphoric mood and sleep disturbance. The patient is not nervous/anxious.    Objective:     Vital Signs (Most Recent):  Temp: 98.8 °F (37.1 °C) (08/27/22 1913)  Pulse: 76 (08/27/22 1913)  Resp: 18 (08/27/22 1913)  BP: 119/68 (08/27/22 1913)  SpO2: 97 % (08/27/22 1913) Vital Signs (24h Range):  Temp:  [98.2 °F (36.8 °C)-98.8 °F (37.1 °C)] 98.8 °F (37.1 °C)  Pulse:  [63-82] 76  Resp:  [18-20] 18  SpO2:  [95 %-98 %] 97 %  BP: ()/(55-89) 119/68     Weight: 95.8 kg (211 lb 3.2 oz)  Body mass index is 42.66 kg/m².    Physical Exam  Vitals reviewed.   Constitutional:       General: She is not in acute distress.     Appearance: Normal appearance.   HENT:      Head: Normocephalic and atraumatic.      Mouth/Throat:      Mouth: Mucous membranes are moist.      Pharynx: Posterior oropharyngeal erythema present. No oropharyngeal exudate.   Eyes:      General: No scleral icterus.     Extraocular Movements: Extraocular movements intact.      Pupils: Pupils are equal, round, and reactive to light.   Cardiovascular:      Rate and Rhythm: Normal rate and regular rhythm.      Heart sounds: Normal heart sounds.   Pulmonary:      Effort: Pulmonary effort is normal. No respiratory distress.      Breath sounds: Wheezing present. No rhonchi or rales.   Abdominal:       General: Bowel sounds are normal. There is no distension.      Palpations: Abdomen is soft.      Tenderness: There is no abdominal tenderness.   Musculoskeletal:      Cervical back: Normal range of motion and neck supple.      Right lower leg: No edema.      Left lower leg: No edema.   Skin:     General: Skin is warm and dry.      Findings: No rash.   Neurological:      General: No focal deficit present.      Mental Status: She is alert and oriented to person, place, and time.   Psychiatric:         Mood and Affect: Mood normal.         Behavior: Behavior normal.         CRANIAL NERVES     CN III, IV, VI   Pupils are equal, round, and reactive to light.     Significant Labs: All pertinent labs within the past 24 hours have been reviewed.    Significant Imaging: I have reviewed all pertinent imaging results/findings within the past 24 hours.    Assessment/Plan:     * Pneumonia due to COVID-19 virus  Patient diagnosed 8/23. Currently requiring 2L supplemental oxygen. She was treated at OSH with dexamethasone and IV antibiotics including azithromycin and rocephin. Given O2 requirement and comorbidities including HTN, DM2, and obesity patient is at risk of worsening disease and complications; will initiate remdesivir.     - Repeat CXR given worsened respiratory status and last was 8/24 prior to admission to OSH   - Will start remdesivir as above  - Dexamethasone 6mg q24h   - Will initiate therapeutic dose anticoagulation with lovenox 1mg/kg  - Famotidine 20mg BID     Hypoxia  - Patient with new O2 requirement in the setting of COVID pneumonia   - Currently maintaining appropriate sat on 2L via NC, will wean as tolerated   - Repeat CXR pending     Diabetes mellitus  - On oral medications for chronic management of DM2, holding while inpatient   - A1c on admission 9.8  - Will start levemir 15u qhs with SSI, increase basal insulin as needed as patient is receiving steroids     Hypertension  - Controlled on current home  meds, will continue    COPD (chronic obstructive pulmonary disease)  - Continue scheduled and PRN inhalers, unable to use nebulized medications 2/2 COVID infection     Tobacco use  Patient with significant smoking history but has not used tobacco since symptom onset. Refuses nicotine patch at this time.     VTE Risk Mitigation (From admission, onward)           Ordered     enoxaparin injection 100 mg  Every 12 hours (non-standard times)         08/27/22 2306     IP VTE HIGH RISK PATIENT  Once         08/27/22 3440                       Maggie Slaughter MD  Department of Hospital Medicine   [facility] - [facility] [address] [facility] Tele[facility]

## 2022-08-28 NOTE — ASSESSMENT & PLAN NOTE
Patient diagnosed 8/23. Currently requiring 2L supplemental oxygen. She was treated at OSH with dexamethasone and IV antibiotics including azithromycin and rocephin. Given O2 requirement and comorbidities including HTN, DM2, and obesity patient is at risk of worsening disease and complications; will initiate remdesivir.     - Repeat CXR shows no acute cardiopulmonary processes  - Continue Remdesivir   - Dexamethasone 6mg q24h   - Will initiate therapeutic dose anticoagulation with lovenox 1mg/kg  - Famotidine 20mg BID

## 2022-08-29 LAB
GLUCOSE SERPL-MCNC: 218 MG/DL (ref 70–105)
GLUCOSE SERPL-MCNC: 241 MG/DL (ref 70–105)
GLUCOSE SERPL-MCNC: 401 MG/DL (ref 70–105)

## 2022-08-29 PROCEDURE — 94761 N-INVAS EAR/PLS OXIMETRY MLT: CPT

## 2022-08-29 PROCEDURE — 82962 GLUCOSE BLOOD TEST: CPT

## 2022-08-29 PROCEDURE — 25000003 PHARM REV CODE 250

## 2022-08-29 PROCEDURE — 63600175 PHARM REV CODE 636 W HCPCS

## 2022-08-29 PROCEDURE — 99239 PR HOSPITAL DISCHARGE DAY,>30 MIN: ICD-10-PCS | Mod: CR,GC,, | Performed by: INTERNAL MEDICINE

## 2022-08-29 PROCEDURE — 25000003 PHARM REV CODE 250: Performed by: FAMILY MEDICINE

## 2022-08-29 PROCEDURE — 94640 AIRWAY INHALATION TREATMENT: CPT

## 2022-08-29 PROCEDURE — 25000003 PHARM REV CODE 250: Performed by: HOSPITALIST

## 2022-08-29 PROCEDURE — 25000242 PHARM REV CODE 250 ALT 637 W/ HCPCS

## 2022-08-29 PROCEDURE — 63600175 PHARM REV CODE 636 W HCPCS: Performed by: FAMILY MEDICINE

## 2022-08-29 PROCEDURE — 99239 HOSP IP/OBS DSCHRG MGMT >30: CPT | Mod: CR,GC,, | Performed by: INTERNAL MEDICINE

## 2022-08-29 PROCEDURE — S4991 NICOTINE PATCH NONLEGEND: HCPCS

## 2022-08-29 PROCEDURE — 27000221 HC OXYGEN, UP TO 24 HOURS

## 2022-08-29 RX ORDER — NICOTINE 7MG/24HR
1 PATCH, TRANSDERMAL 24 HOURS TRANSDERMAL DAILY
Qty: 7 PATCH | Refills: 0 | Status: SHIPPED | OUTPATIENT
Start: 2022-08-29 | End: 2022-09-05

## 2022-08-29 RX ORDER — IBUPROFEN 200 MG
1 TABLET ORAL DAILY
Status: DISCONTINUED | OUTPATIENT
Start: 2022-08-29 | End: 2022-08-29 | Stop reason: HOSPADM

## 2022-08-29 RX ORDER — IBUPROFEN 200 MG
1 TABLET ORAL DAILY
Qty: 7 PATCH | Refills: 0 | Status: SHIPPED | OUTPATIENT
Start: 2022-08-29 | End: 2022-09-05

## 2022-08-29 RX ADMIN — TIOTROPIUM BROMIDE INHALATION SPRAY 2 PUFF: 3.12 SPRAY, METERED RESPIRATORY (INHALATION) at 07:08

## 2022-08-29 RX ADMIN — LOSARTAN POTASSIUM 100 MG: 100 TABLET, FILM COATED ORAL at 09:08

## 2022-08-29 RX ADMIN — ENOXAPARIN SODIUM 100 MG: 100 INJECTION SUBCUTANEOUS at 01:08

## 2022-08-29 RX ADMIN — REMDESIVIR 100 MG: 100 INJECTION, POWDER, LYOPHILIZED, FOR SOLUTION INTRAVENOUS at 09:08

## 2022-08-29 RX ADMIN — FUROSEMIDE 40 MG: 40 TABLET ORAL at 09:08

## 2022-08-29 RX ADMIN — DEXAMETHASONE 6 MG: 2 TABLET ORAL at 01:08

## 2022-08-29 RX ADMIN — MUPIROCIN: 20 OINTMENT TOPICAL at 09:08

## 2022-08-29 RX ADMIN — INSULIN ASPART 2 UNITS: 100 INJECTION, SOLUTION INTRAVENOUS; SUBCUTANEOUS at 01:08

## 2022-08-29 RX ADMIN — DULOXETINE 20 MG: 20 CAPSULE, DELAYED RELEASE ORAL at 09:08

## 2022-08-29 RX ADMIN — FAMOTIDINE 20 MG: 20 TABLET, FILM COATED ORAL at 09:08

## 2022-08-29 RX ADMIN — DEXAMETHASONE SODIUM PHOSPHATE 6 MG: 4 INJECTION, SOLUTION INTRAMUSCULAR; INTRAVENOUS at 09:08

## 2022-08-29 RX ADMIN — GABAPENTIN 300 MG: 300 CAPSULE ORAL at 09:08

## 2022-08-29 RX ADMIN — BISACODYL 10 MG: 5 TABLET, COATED ORAL at 09:08

## 2022-08-29 RX ADMIN — NICOTINE 1 PATCH: 14 PATCH, EXTENDED RELEASE TRANSDERMAL at 01:08

## 2022-08-29 RX ADMIN — INSULIN ASPART 2 UNITS: 100 INJECTION, SOLUTION INTRAVENOUS; SUBCUTANEOUS at 06:08

## 2022-08-29 NOTE — DISCHARGE INSTRUCTIONS
Continue taking your home medications as previously prescribed.     2 week taper of nicotine using 14 mg patches daily for 1st week and 7 mg patches daily for 2nd week    Follow a diabetic diet.    - Avoiding sugary sodas (consider 0 or very low calorie (under 25 calories per container) sparkling water)    - Avoiding the consumption of breads, cereals, rice, or anything that can be stored on the shelf for a month or longer    - Replacement of dessert foods with berries (strawberries, blueberries, raspberries, blackberry etc.)    - Monitor sugar (glucose) levels daily and keep a log.

## 2022-08-29 NOTE — NURSING
1328: After pt completed 6 minute walk, pt's o2 was at 95%. No complaints/requests. Safety precautions in place. CB within reach. Will cont plan of care.

## 2022-08-29 NOTE — ASSESSMENT & PLAN NOTE
Smoking cessation education administered at discharge with patient accepting 14 mg nicotine patch with a taper prescribed outpatient.     Follow up with PCP for further problems.   no concerns

## 2022-08-29 NOTE — PLAN OF CARE
Problem: Adult Inpatient Plan of Care  Goal: Plan of Care Review  Outcome: Ongoing, Progressing  Goal: Patient-Specific Goal (Individualized)  Outcome: Ongoing, Progressing  Goal: Absence of Hospital-Acquired Illness or Injury  Outcome: Ongoing, Progressing  Goal: Optimal Comfort and Wellbeing  Outcome: Ongoing, Progressing  Goal: Readiness for Transition of Care  Outcome: Ongoing, Progressing     Problem: Bariatric Environmental Safety  Goal: Safety Maintained with Care  Outcome: Ongoing, Progressing     Problem: Diabetes Comorbidity  Goal: Blood Glucose Level Within Targeted Range  Outcome: Ongoing, Progressing     Problem: Gas Exchange Impaired  Goal: Optimal Gas Exchange  Outcome: Ongoing, Progressing     Problem: Breathing Pattern Ineffective  Goal: Effective Breathing Pattern  Outcome: Ongoing, Progressing

## 2022-08-29 NOTE — ASSESSMENT & PLAN NOTE
- CXR shows no evidence of pneumonia  - SpO2 on RA 98% so no supplemental O2 needed  - Redesivir, and Abx discontinued   - Follow up with PCP for any questions

## 2022-08-29 NOTE — PLAN OF CARE
"SS received order for home oxygen "per patient request". SS spoke with floor RN, Jovana. Patient's O2 sat on room air is 96%. Patient will not qualify for home oxygen unless room air O2 sat is less that 88%. Patient notified and she verbalizes understanding.   "

## 2022-08-29 NOTE — DISCHARGE SUMMARY
[facility] - [address] [place] [address] Medicine  Discharge Summary      Patient Name: Seamus Urbina  MRN: [id number]  Patient Class: IP- Inpatient  Admission Date: 8/27/2022  Hospital Length of Stay: 2 days  Discharge Date and Time: No discharge date for patient encounter.  Attending Physician: Norma Muir,*   Discharging Provider: Aditya Dominguez DO  Primary Care Provider: Halley Harden MD      HPI:   58yo F who was transferred to [facility] from [facility] with worsening dyspnea and COVID-19.     Patient presented to OSH ED on 8/23 after being ill at home for several days prior. She was diagnosed with COVID and given steroids and discharged home. Returned next day with worsening dyspnea, coughing, and wheezing as well as hyperglycemia secondary to steroid use. She has a history of DM2, HTN, and COPD with tobacco use but does not require home oxygen. Patient was admitted to OSH for further treatment including dexamethasone, IV antibiotics, and supplemental oxygen. On day of transfer she was more dyspneic though still with appropriate sat on 2L NC. Given her comorbidities and risk of decompensation she was transferred to [facility] for further care.     On my exam she is breathing comfortably at rest on 2L NC but does have frequent coughing and expiratory wheezing with deep inspiration. Reports chest tightness but denies chest pain. Also with nausea and constipation with only one small BM over last several days. On review of OSH records, CMP unremarkable with exception of . Leukocytosis to 15.6. A1c elevated at 9.8 and BNP of 316. CRP elevated at 20. CXR without focal infiltrate but with increased interstitial markings per report.       * No surgery found *      Hospital Course:   Patient is a 56 yo female who has a past medical history of Cervical radiculopathy, COPD (chronic obstructive pulmonary disease), Diabetes mellitus, and Hypertension. She presented to our service  following Covid exacerbation at another facility. She is obese with bilateral crackles and coarseness heard throughout lung fields with no wheeze but she does have increased respiratory rate with any exertion during admission. Patient was then started on Patient was started on dex, zithromax, rocephin, supplemental oxygen, remdesivir, heparin, pepcid and she showed up for low risk on VTE prophylaxis due to D-Dimer not being elevated.     8/28: Pt reports that her breathing feels to be at baseline as long as she is sitting but she does feel SOB if she gets up and moves.    8/29 Pt. Is doing much better, still has expiratory wheezes, and deep breaths exacerbates her coughing. CXR was negative for pneumonia so Abx, and remdesivir were stopped. Smoking cessation and diabetic education provided along with nicotine patch administered.     Patient has reached maximum hospital benefit and will be seen outpatient by her PCP.        Goals of Care Treatment Preferences:  Code Status: Full Code      Consults:   Consults (From admission, onward)        Status Ordering Provider     Inpatient consult to Diabetes educator  Once        Provider:  (Not yet assigned)    Acknowledged TERRY FORD     Inpatient consult to Social Work  Once        Provider:  (Not yet assigned)    Completed LULÚ RIGGS          * Pneumonia due to COVID-19 virus  - CXR shows no evidence of pneumonia  - SpO2 on RA 98% so no supplemental O2 needed  - Redesivir, and Abx discontinued   - Follow up with PCP for any questions    COPD (chronic obstructive pulmonary disease)  - Follow up with PCP for further management     Hypoxia  - SpO2 on RA is 98% no supplemental oxygen required.    Tobacco use  Smoking cessation education administered at discharge with patient accepting 14 mg nicotine patch with a taper prescribed outpatient.     Follow up with PCP for further problems.    Hypertension  - Follow up with PCP for further management     Diabetes  mellitus  - On oral medications for chronic management of DM2, holding while inpatient. Will restart diabetic medications with PCP  - A1c on admission 9.8%      Final Active Diagnoses:    Diagnosis Date Noted POA    PRINCIPAL PROBLEM:  Pneumonia due to COVID-19 virus [U07.1, J12.82] 08/27/2022 Yes    Hypoxia [R09.02] 08/28/2022 Yes    COPD (chronic obstructive pulmonary disease) [J44.9]  Yes    Tobacco use [Z72.0] 08/27/2022 Yes    Diabetes mellitus [E11.9] 05/17/2020 Yes    Hypertension [I10] 05/17/2020 Yes      Problems Resolved During this Admission:       Discharged Condition: stable    Disposition: Home or Self Care    Follow Up:   Follow-up Information     Halley Harden MD Follow up on 9/12/2022.    Specialty: Family Medicine  Why: If symptoms worsen, and need help with diabetic diet please contact your PCP; 10:15 am  Contact information:   [address]  [facility] [address] [phone]                       Patient Instructions:      Diet diabetic       Significant Diagnostic Studies: Cardiac Graphics: Echocardiogram: 2D echo with color flow doppler: No results found for this or any previous visit.    Pending Diagnostic Studies:     None         Medications:  Reconciled Home Medications:      Medication List      START taking these medications    * nicotine 14 mg/24 hr  Commonly known as: NICODERM CQ  Place 1 patch onto the skin once daily. for 7 days     * nicotine 7 mg/24 hr  Commonly known as: NICODERM CQ  Place 1 patch onto the skin once daily. for 7 days         * This list has 2 medication(s) that are the same as other medications prescribed for you. Read the directions carefully, and ask your doctor or other care provider to review them with you.            CONTINUE taking these medications    albuterol 90 mcg/actuation inhaler  Commonly known as: PROVENTIL/VENTOLIN HFA  Inhale 2-4 puffs into the lungs 4 (four) times daily as needed.     dulaglutide 1.5 mg/0.5 mL pen injector  Commonly known as:  TRULICITY  Inject 1.5 mg into the skin once a week.     DULoxetine 20 MG capsule  Commonly known as: CYMBALTA  Take 20 mg by mouth once daily.     FARXIGA 10 mg tablet  Generic drug: dapagliflozin  Take 10 mg by mouth every morning.     furosemide 40 MG tablet  Commonly known as: LASIX  Take 40 mg by mouth once daily.     gabapentin 300 MG capsule  Commonly known as: NEURONTIN  Take 300 mg by mouth 2 (two) times daily.     HYDROcodone-acetaminophen 7.5-325 mg per tablet  Commonly known as: NORCO  Take 1 tablet by mouth every 6 (six) hours as needed.     losartan 100 MG tablet  Commonly known as: COZAAR  Take 100 mg by mouth once daily at 6am.     metFORMIN 1000 MG tablet  Commonly known as: GLUCOPHAGE  Take 1,000 mg by mouth 2 (two) times a day.     SITagliptin 100 MG Tab  Commonly known as: JANUVIA  Take 100 mg by mouth once daily at 6am.     TRELEGY ELLIPTA 200-62.5-25 mcg inhaler  Generic drug: fluticasone-umeclidin-vilanter  Inhale 1 puff into the lungs once daily.            Indwelling Lines/Drains at time of discharge:   Lines/Drains/Airways     None                 Time spent on the discharge of patient: 42 minutes         Aditya Dominguez DO  Department of Hospital Medicine  [facility] - [address] [place] [address]

## 2022-08-29 NOTE — ASSESSMENT & PLAN NOTE
- On oral medications for chronic management of DM2, holding while inpatient. Will restart diabetic medications with PCP  - A1c on admission 9.8%

## 2022-08-29 NOTE — HOSPITAL COURSE
Patient is a 56 yo female who has a past medical history of Cervical radiculopathy, COPD (chronic obstructive pulmonary disease), Diabetes mellitus, and Hypertension. She presented to our service following Covid exacerbation at another facility. She is obese with bilateral crackles and coarseness heard throughout lung fields with no wheeze but she does have increased respiratory rate with any exertion during admission. Patient was then started on Patient was started on dex, zithromax, rocephin, supplemental oxygen, remdesivir, heparin, pepcid and she showed up for low risk on VTE prophylaxis due to D-Dimer not being elevated.     8/28: Pt reports that her breathing feels to be at baseline as long as she is sitting but she does feel SOB if she gets up and moves.    8/29 Pt. Is doing much better, still has expiratory wheezes, and deep breaths exacerbates her coughing. CXR was negative for pneumonia so Abx, and remdesivir were stopped. Smoking cessation and diabetic education provided along with nicotine patch administered.     Patient has reached maximum hospital benefit and will be seen outpatient by her PCP.

## 2022-08-30 NOTE — PLAN OF CARE
8/29/22 8910 SS received message from floor RN, Jovana. Patient has a friend that can come get her but her friend is asking for $60 for gas to take her home. Patient states she can not afford to pay that much. SS spoke with supervisor, Ruth Devlin. SS able to provide $50 gas gift card to patient for gas money home. Gift card given to floor nurse, Jovana.

## 2022-09-02 VITALS
DIASTOLIC BLOOD PRESSURE: 99 MMHG | TEMPERATURE: 98 F | RESPIRATION RATE: 18 BRPM | HEIGHT: 59 IN | SYSTOLIC BLOOD PRESSURE: 130 MMHG | BODY MASS INDEX: 46.97 KG/M2 | WEIGHT: 233 LBS | OXYGEN SATURATION: 97 % | HEART RATE: 97 BPM

## 2022-11-08 NOTE — PLAN OF CARE
Problem: Adult Inpatient Plan of Care  Goal: Plan of Care Review  Outcome: Ongoing, Progressing  Goal: Patient-Specific Goal (Individualized)  Outcome: Ongoing, Progressing  Goal: Absence of Hospital-Acquired Illness or Injury  Outcome: Ongoing, Progressing  Goal: Optimal Comfort and Wellbeing  Outcome: Ongoing, Progressing  Goal: Readiness for Transition of Care  Outcome: Ongoing, Progressing     Problem: Bariatric Environmental Safety  Goal: Safety Maintained with Care  Outcome: Ongoing, Progressing  POC reviewed and continues      15

## 2022-11-28 PROBLEM — U07.1 PNEUMONIA DUE TO COVID-19 VIRUS: Status: RESOLVED | Noted: 2022-08-27 | Resolved: 2022-11-28

## 2022-11-28 PROBLEM — J12.82 PNEUMONIA DUE TO COVID-19 VIRUS: Status: RESOLVED | Noted: 2022-08-27 | Resolved: 2022-11-28
